# Patient Record
Sex: MALE | Race: OTHER | HISPANIC OR LATINO | Employment: FULL TIME | URBAN - METROPOLITAN AREA
[De-identification: names, ages, dates, MRNs, and addresses within clinical notes are randomized per-mention and may not be internally consistent; named-entity substitution may affect disease eponyms.]

---

## 2020-06-26 ENCOUNTER — APPOINTMENT (EMERGENCY)
Dept: RADIOLOGY | Facility: HOSPITAL | Age: 45
End: 2020-06-26
Payer: COMMERCIAL

## 2020-06-26 ENCOUNTER — HOSPITAL ENCOUNTER (EMERGENCY)
Facility: HOSPITAL | Age: 45
Discharge: HOME | End: 2020-06-26
Attending: EMERGENCY MEDICINE | Admitting: EMERGENCY MEDICINE
Payer: COMMERCIAL

## 2020-06-26 VITALS
HEART RATE: 93 BPM | DIASTOLIC BLOOD PRESSURE: 73 MMHG | RESPIRATION RATE: 14 BRPM | OXYGEN SATURATION: 99 % | TEMPERATURE: 97.9 F | SYSTOLIC BLOOD PRESSURE: 137 MMHG

## 2020-06-26 DIAGNOSIS — L03.119 CELLULITIS OF FOOT: Primary | ICD-10-CM

## 2020-06-26 PROCEDURE — 99283 EMERGENCY DEPT VISIT LOW MDM: CPT

## 2020-06-26 PROCEDURE — 99284 EMERGENCY DEPT VISIT MOD MDM: CPT | Performed by: PHYSICIAN ASSISTANT

## 2020-06-26 PROCEDURE — 73630 X-RAY EXAM OF FOOT: CPT

## 2020-06-26 RX ORDER — CEPHALEXIN 500 MG/1
500 CAPSULE ORAL EVERY 6 HOURS SCHEDULED
Qty: 28 CAPSULE | Refills: 0 | Status: SHIPPED | OUTPATIENT
Start: 2020-06-26 | End: 2020-07-03

## 2020-10-29 ENCOUNTER — NURSE TRIAGE (OUTPATIENT)
Dept: OTHER | Facility: OTHER | Age: 45
End: 2020-10-29

## 2021-12-11 ENCOUNTER — IMMUNIZATIONS (OUTPATIENT)
Dept: FAMILY MEDICINE CLINIC | Facility: HOSPITAL | Age: 46
End: 2021-12-11

## 2021-12-11 DIAGNOSIS — Z23 ENCOUNTER FOR IMMUNIZATION: Primary | ICD-10-CM

## 2021-12-11 PROCEDURE — 91300 COVID-19 PFIZER VACC 0.3 ML: CPT

## 2021-12-11 PROCEDURE — 0001A COVID-19 PFIZER VACC 0.3 ML: CPT

## 2022-01-18 ENCOUNTER — APPOINTMENT (EMERGENCY)
Dept: RADIOLOGY | Facility: HOSPITAL | Age: 47
End: 2022-01-18
Payer: COMMERCIAL

## 2022-01-18 ENCOUNTER — HOSPITAL ENCOUNTER (EMERGENCY)
Facility: HOSPITAL | Age: 47
Discharge: HOME | End: 2022-01-18
Attending: EMERGENCY MEDICINE
Payer: COMMERCIAL

## 2022-01-18 VITALS
RESPIRATION RATE: 16 BRPM | OXYGEN SATURATION: 96 % | HEART RATE: 96 BPM | WEIGHT: 242.06 LBS | TEMPERATURE: 98.3 F | DIASTOLIC BLOOD PRESSURE: 88 MMHG | SYSTOLIC BLOOD PRESSURE: 169 MMHG

## 2022-01-18 DIAGNOSIS — L03.115 CELLULITIS OF RIGHT ANKLE: Primary | ICD-10-CM

## 2022-01-18 PROCEDURE — 73610 X-RAY EXAM OF ANKLE: CPT

## 2022-01-18 PROCEDURE — 99284 EMERGENCY DEPT VISIT MOD MDM: CPT | Performed by: PHYSICIAN ASSISTANT

## 2022-01-18 PROCEDURE — 99283 EMERGENCY DEPT VISIT LOW MDM: CPT

## 2022-01-18 RX ORDER — CEPHALEXIN 500 MG/1
500 CAPSULE ORAL EVERY 6 HOURS SCHEDULED
Qty: 40 CAPSULE | Refills: 0 | Status: SHIPPED | OUTPATIENT
Start: 2022-01-18 | End: 2022-01-28

## 2022-01-18 NOTE — Clinical Note
Brando Bethea was seen and treated in our emergency department on 1/18/2022  Diagnosis:     Demetrio    He may return on this date: 01/19/2022         If you have any questions or concerns, please don't hesitate to call        Filomena Zheng PA-C    ______________________________           _______________          _______________  Hospital Representative                              Date                                Time

## 2022-01-18 NOTE — ED PROVIDER NOTES
History  Chief Complaint   Patient presents with    Ankle Injury     Pt twisted ankle last Tuesday  Reports pain on Thursday and redness/swelling 2 days ago  Fever beginning last night  Motrin last taken at 0430  Hx of cellulitis in right ankle  Ankle Pain  Location:  Ankle  Time since incident:  3 days  Injury: yes    Ankle location:  R ankle  Pain details:     Quality:  Aching    Radiates to:  Does not radiate    Severity:  Mild    Onset quality:  Gradual    Progression:  Unchanged  Chronicity:  New  Dislocation: no    Foreign body present:  No foreign bodies  Associated symptoms: fever    Associated symptoms: no back pain    Associated symptoms comment:  Redness      None       History reviewed  No pertinent past medical history  Past Surgical History:   Procedure Laterality Date    SHOULDER SURGERY Left        History reviewed  No pertinent family history  I have reviewed and agree with the history as documented  E-Cigarette/Vaping    E-Cigarette Use Never User      E-Cigarette/Vaping Substances     Social History     Tobacco Use    Smoking status: Never Smoker    Smokeless tobacco: Never Used   Vaping Use    Vaping Use: Never used   Substance Use Topics    Alcohol use: Never    Drug use: Never       Review of Systems   Constitutional: Positive for fever  Negative for chills  HENT: Negative for ear pain and sore throat  Eyes: Negative for pain and visual disturbance  Respiratory: Negative for cough and shortness of breath  Cardiovascular: Negative for chest pain and palpitations  Gastrointestinal: Negative for abdominal pain and vomiting  Genitourinary: Negative for dysuria and hematuria  Musculoskeletal: Positive for arthralgias  Negative for back pain  Skin: Positive for color change  Negative for rash  Neurological: Negative for seizures and syncope  Psychiatric/Behavioral: Negative for behavioral problems, confusion and sleep disturbance     All other systems reviewed and are negative  Physical Exam  Physical Exam  Vitals and nursing note reviewed  Constitutional:       General: He is not in acute distress  Appearance: Normal appearance  He is not ill-appearing, toxic-appearing or diaphoretic  HENT:      Head: Normocephalic and atraumatic  Mouth/Throat:      Mouth: Mucous membranes are moist    Eyes:      General: No scleral icterus  Pupils: Pupils are equal, round, and reactive to light  Cardiovascular:      Rate and Rhythm: Normal rate and regular rhythm  Pulses: Normal pulses  Heart sounds: Normal heart sounds  Pulmonary:      Effort: Pulmonary effort is normal       Breath sounds: Normal breath sounds  Musculoskeletal:         General: Tenderness (right lateral mallelous erythema) present  No swelling  Normal range of motion  Cervical back: Normal range of motion  Skin:     General: Skin is warm  Capillary Refill: Capillary refill takes less than 2 seconds  Neurological:      General: No focal deficit present  Mental Status: He is alert and oriented to person, place, and time     Psychiatric:         Mood and Affect: Mood normal          Vital Signs  ED Triage Vitals   Temperature Pulse Respirations Blood Pressure SpO2   01/18/22 1245 01/18/22 1247 01/18/22 1249 01/18/22 1247 01/18/22 1247   98 3 °F (36 8 °C) 96 16 169/88 96 %      Temp Source Heart Rate Source Patient Position - Orthostatic VS BP Location FiO2 (%)   01/18/22 1245 01/18/22 1247 01/18/22 1247 01/18/22 1247 --   Oral Monitor Sitting Right arm       Pain Score       --                  Vitals:    01/18/22 1247   BP: 169/88   Pulse: 96   Patient Position - Orthostatic VS: Sitting         Visual Acuity      ED Medications  Medications - No data to display    Diagnostic Studies  Results Reviewed     None                 XR ankle 3+ views RIGHT   ED Interpretation by Rebel Garsia PA-C (01/18 1088)   No acute osseous abnormality Procedures  Procedures         ED Course                                             MDM  Number of Diagnoses or Management Options  Cellulitis of right ankle: new and requires workup     Amount and/or Complexity of Data Reviewed  Tests in the radiology section of CPT®: ordered and reviewed  Independent visualization of images, tracings, or specimens: yes    Patient Progress  Patient progress: stable      Disposition  Final diagnoses:   Cellulitis of right ankle     Time reflects when diagnosis was documented in both MDM as applicable and the Disposition within this note     Time User Action Codes Description Comment    1/18/2022  3:06 PM Baezasantiago Patel Add [D24 [id number]] Cellulitis of right ankle       ED Disposition     ED Disposition Condition Date/Time Comment    Discharge Stable Tue Jan 18, 2022  3:34  [address] discharge to home/self care  Follow-up Information     Follow up With Specialties Details Why [facility] Emergency Department Emergency Medicine  If symptoms worsen [facility] [id number]  [address] [facility] Emergency Department, [address] [place] [address], [id number]          Discharge Medication List as of 1/18/2022  3:35 PM      START taking these medications    Details   cephalexin (KEFLEX) 500 mg capsule Take 1 capsule (500 mg total) by mouth every 6 (six) hours for 10 days, Starting Tue 1/18/2022, Until Fri 1/28/2022, Normal             No discharge procedures on file      PDMP Review     None          ED Provider  Electronically Signed by           Reina Vargas PA-C  01/18/22 7316

## 2022-01-18 NOTE — Clinical Note
Elelian CastroEdwina was seen and treated in our emergency department on 1/18/2022  Diagnosis:     Demetrio    He may return on this date: 01/19/2022         If you have any questions or concerns, please don't hesitate to call        Marry Vincent PA-C    ______________________________           _______________          _______________  Hospital Representative                              Date                                Time

## 2022-10-11 ENCOUNTER — HOSPITAL ENCOUNTER (EMERGENCY)
Facility: HOSPITAL | Age: 47
Discharge: HOME | End: 2022-10-11
Attending: EMERGENCY MEDICINE
Payer: COMMERCIAL

## 2022-10-11 VITALS
RESPIRATION RATE: 18 BRPM | HEART RATE: 75 BPM | SYSTOLIC BLOOD PRESSURE: 118 MMHG | TEMPERATURE: 98.3 F | OXYGEN SATURATION: 97 % | DIASTOLIC BLOOD PRESSURE: 63 MMHG

## 2022-10-11 DIAGNOSIS — S39.012A STRAIN OF LUMBAR REGION, INITIAL ENCOUNTER: Primary | ICD-10-CM

## 2022-10-11 PROCEDURE — 96372 THER/PROPH/DIAG INJ SC/IM: CPT

## 2022-10-11 PROCEDURE — 99284 EMERGENCY DEPT VISIT MOD MDM: CPT | Performed by: EMERGENCY MEDICINE

## 2022-10-11 PROCEDURE — 99283 EMERGENCY DEPT VISIT LOW MDM: CPT

## 2022-10-11 RX ORDER — KETOROLAC TROMETHAMINE 30 MG/ML
15 INJECTION, SOLUTION INTRAMUSCULAR; INTRAVENOUS ONCE
Status: COMPLETED | OUTPATIENT
Start: 2022-10-11 | End: 2022-10-11

## 2022-10-11 RX ORDER — LIDOCAINE 50 MG/G
1 PATCH TOPICAL DAILY
Qty: 5 PATCH | Refills: 0 | Status: SHIPPED | OUTPATIENT
Start: 2022-10-11

## 2022-10-11 RX ORDER — METHOCARBAMOL 500 MG/1
500 TABLET, FILM COATED ORAL 2 TIMES DAILY
Qty: 14 TABLET | Refills: 0 | Status: SHIPPED | OUTPATIENT
Start: 2022-10-11 | End: 2022-10-18

## 2022-10-11 RX ORDER — LIDOCAINE 50 MG/G
2 PATCH TOPICAL ONCE
Status: DISCONTINUED | OUTPATIENT
Start: 2022-10-11 | End: 2022-10-11 | Stop reason: HOSPADM

## 2022-10-11 RX ORDER — NAPROXEN 500 MG/1
500 TABLET ORAL 2 TIMES DAILY WITH MEALS
Qty: 14 TABLET | Refills: 0 | Status: SHIPPED | OUTPATIENT
Start: 2022-10-11 | End: 2022-10-18

## 2022-10-11 RX ORDER — METHOCARBAMOL 500 MG/1
500 TABLET, FILM COATED ORAL ONCE
Status: COMPLETED | OUTPATIENT
Start: 2022-10-11 | End: 2022-10-11

## 2022-10-11 RX ADMIN — METHOCARBAMOL 500 MG: 500 TABLET ORAL at 06:28

## 2022-10-11 RX ADMIN — LIDOCAINE 2 PATCH: 50 PATCH CUTANEOUS at 06:30

## 2022-10-11 RX ADMIN — KETOROLAC TROMETHAMINE 15 MG: 30 INJECTION, SOLUTION INTRAMUSCULAR; INTRAVENOUS at 06:30

## 2022-10-11 NOTE — ED ATTENDING ATTESTATION
10/11/2022  IHalle MD, saw and evaluated the patient  I have discussed the patient with the resident/non-physician practitioner and agree with the resident's/non-physician practitioner's findings, Plan of Care, and MDM as documented in the resident's/non-physician practitioner's note, except where noted  All available labs and Radiology studies were reviewed  I was present for key portions of any procedure(s) performed by the resident/non-physician practitioner and I was immediately available to provide assistance  At this point I agree with the current assessment done in the Emergency Department  I have conducted an independent evaluation of this patient a history and physical is as follows:    Patient with no significant past medical history complains of low back pain after sweeping  Paraspinal lumbar tenderness with some radiation down to the right buttock  Numbness or weakness  No bowel or bladder complaints  No fever  No other trauma  He is able to get himself up off the stretcher and he is ambulatory with an antalgic gait slightly hunched over  There was no midline tenderness  Reflexes in the lower extremities appear intact sensory intact strength intact with a negative straight leg raise  Ambulatory referral to [facility]  Ambulatory referral for primary care doctor as he states he does not have 1  Will try anti-inflammatories, Lidoderm and muscle relaxer as well      ED Course         Critical Care Time  Procedures

## 2022-10-11 NOTE — ED PROVIDER NOTES
History  Chief Complaint   Patient presents with   • Back Pain     Pt reports low back pain that radiates down right leg since yesterday  Pt states "I think I threw my back out "      51 y/o male patient presents to ED c/o lower back pain onset yesterday  Patient states he was sweeping the floor when he felt sudden back pain in his lower back  Denies radiation of pain  States took tylenol and ibuprofen yesterday with minimal improvement  Denies fever, incontineence, constipation, retention  Denies IV drug use  State pain with certain positions such as bending over  Denies previous hx of back pain  None       History reviewed  No pertinent past medical history  Past Surgical History:   Procedure Laterality Date   • SHOULDER SURGERY Left        History reviewed  No pertinent family history  I have reviewed and agree with the history as documented  E-Cigarette/Vaping   • E-Cigarette Use Never User      E-Cigarette/Vaping Substances     Social History     Tobacco Use   • Smoking status: Never Smoker   • Smokeless tobacco: Never Used   Vaping Use   • Vaping Use: Never used   Substance Use Topics   • Alcohol use: Never   • Drug use: Never        Review of Systems   Constitutional: Negative for chills and fever  HENT: Negative for congestion, rhinorrhea and sore throat  Respiratory: Negative for cough, chest tightness and shortness of breath  Cardiovascular: Negative for chest pain and leg swelling  Gastrointestinal: Negative for abdominal pain, diarrhea, nausea and vomiting  Genitourinary: Negative for difficulty urinating and dysuria  Musculoskeletal: Positive for back pain  Negative for arthralgias and myalgias  Skin: Negative for rash and wound  Neurological: Negative for dizziness and headaches  All other systems reviewed and are negative        Physical Exam  ED Triage Vitals   Temperature Pulse Respirations Blood Pressure SpO2   10/11/22 0547 10/11/22 0547 10/11/22 0547 10/11/22 0547 10/11/22 0547   98 3 °F (36 8 °C) 75 18 118/63 97 %      Temp Source Heart Rate Source Patient Position - Orthostatic VS BP Location FiO2 (%)   10/11/22 0547 10/11/22 0547 -- 10/11/22 0547 --   Temporal Monitor  Left arm       Pain Score       10/11/22 0630       10 - Worst Possible Pain             Orthostatic Vital Signs  Vitals:    10/11/22 0547   BP: 118/63   Pulse: 75       Physical Exam  Vitals reviewed  Constitutional:       Appearance: Normal appearance  HENT:      Head: Normocephalic and atraumatic  Nose: Nose normal       Mouth/Throat:      Mouth: Mucous membranes are moist       Pharynx: Oropharynx is clear  Eyes:      Extraocular Movements: Extraocular movements intact  Conjunctiva/sclera: Conjunctivae normal    Cardiovascular:      Rate and Rhythm: Normal rate and regular rhythm  Pulses: Normal pulses  Heart sounds: Normal heart sounds  Pulmonary:      Effort: Pulmonary effort is normal       Breath sounds: Normal breath sounds  Abdominal:      General: Bowel sounds are normal       Palpations: Abdomen is soft  Tenderness: There is no abdominal tenderness  Musculoskeletal:         General: Tenderness (paraspinal lumbar tenderness bilaterally  no midline tenderness) present  Normal range of motion  Cervical back: Normal range of motion  Comments: Negative straight leg   Skin:     General: Skin is warm and dry  Neurological:      General: No focal deficit present  Mental Status: He is alert and oriented to person, place, and time  Mental status is at baseline           ED Medications  Medications   ketorolac (TORADOL) injection 15 mg (15 mg Intramuscular Given 10/11/22 0630)   methocarbamol (ROBAXIN) tablet 500 mg (500 mg Oral Given 10/11/22 4720)       Diagnostic Studies  Results Reviewed     None                 No orders to display         Procedures  Procedures      ED Course                             SBIRT 20yo+    Flowsheet Row Most Recent Value   SBIRT (24 yo +)    In order to provide better care to our patients, we are screening all of our patients for alcohol and drug use  Would it be okay to ask you these screening questions? Unable to answer at this time Filed at: 10/11/2022 0720                [facility]  Number of Diagnoses or Management Options  Strain of lumbar region, initial encounter  Diagnosis management comments: 51 y/o male patient presenting with back pain s/p sweeping floor  States pain worsens with certain positions  Exam shows paraspinal tenderness bilateral  No red flags  No midline tenderness, no injuries  tx with toradol, robaxin, and lidocaine patch with improvement  Stable for discharge with follow up with PCP  Return precautions given  Disposition  Final diagnoses:   Strain of lumbar region, initial encounter     Time reflects when diagnosis was documented in both MDM as applicable and the Disposition within this note     Time User Action Codes Description Comment    10/11/2022  7:08 AM Linard Leventhal Add [S39 012A] Strain of lumbar region, initial encounter       ED Disposition     ED Disposition   Discharge    Condition   Stable    Date/Time   Tue Oct 11, 2022  7:11 AM    Comment   [address] [facility] discharge to home/self care                 Follow-up Information     Follow up With Specialties Details Why Contact Info Additional [address] [facility] Medicine Schedule an appointment as soon as possible for a visit in 1 week reevaluation [address], [address] [facility] [address]  [address], [address], [address] [place], [place], [address] [facility] Physical Therapy Schedule an appointment as soon as possible for a visit in 1 week evaluation and treatment [phone]          Discharge Medication List as of 10/11/2022  7:18 AM START taking these medications    Details   lidocaine (Lidoderm) 5 % Apply 1 patch topically daily Remove & Discard patch within 12 hours or as directed by MD, Starting Tue 10/11/2022, Normal      methocarbamol (ROBAXIN) 500 mg tablet Take 1 tablet (500 mg total) by mouth 2 (two) times a day for 7 days, Starting Tue 10/11/2022, Until Tue 10/18/2022, Normal      naproxen (Naprosyn) 500 mg tablet Take 1 tablet (500 mg total) by mouth 2 (two) times a day with meals for 7 days, Starting Tue 10/11/2022, Until Tue 10/18/2022, Normal               PDMP Review     None           ED Provider  Attending physically available and evaluated Maxi Schultzadriana FRAUSTO managed the patient along with the ED Attending      Electronically Signed by         Kaitlynn Escobedo MD  10/12/22 4403

## 2022-10-11 NOTE — DISCHARGE INSTRUCTIONS
You were seen in the ED for back pain  Return to the ED for any worsening symptoms or new symptoms  Follow up with your primary care doctor as soon as possible

## 2022-10-12 ENCOUNTER — TELEPHONE (OUTPATIENT)
Dept: PHYSICAL THERAPY | Facility: OTHER | Age: 47
End: 2022-10-12

## 2022-10-12 NOTE — TELEPHONE ENCOUNTER
Call placed to the patient per [facility] referral     Unable to leave a message the mailbox was full    This is the 1st attempt to reach the patient  Will defer per protocol

## 2022-10-17 ENCOUNTER — TELEPHONE (OUTPATIENT)
Dept: FAMILY MEDICINE CLINIC | Facility: CLINIC | Age: 47
End: 2022-10-17

## 2022-10-17 NOTE — TELEPHONE ENCOUNTER
Call placed to the patient per [facility] referral     Message stated "Call cannot be completed as dialed"    Will defer and try again  This the 2nd attempt to reach the patient  Will defer per protocol